# Patient Record
Sex: MALE | URBAN - METROPOLITAN AREA
[De-identification: names, ages, dates, MRNs, and addresses within clinical notes are randomized per-mention and may not be internally consistent; named-entity substitution may affect disease eponyms.]

---
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
## 2022-02-21 ENCOUNTER — PATIENT MESSAGE (OUTPATIENT)
Dept: RESEARCH | Facility: HOSPITAL | Age: 22
End: 2022-02-21
Payer: OTHER GOVERNMENT

## 2024-10-28 ENCOUNTER — OFFICE VISIT (OUTPATIENT)
Dept: URGENT CARE | Facility: CLINIC | Age: 24
End: 2024-10-28
Payer: COMMERCIAL

## 2024-10-28 VITALS
TEMPERATURE: 99 F | RESPIRATION RATE: 18 BRPM | WEIGHT: 184 LBS | SYSTOLIC BLOOD PRESSURE: 118 MMHG | DIASTOLIC BLOOD PRESSURE: 61 MMHG | OXYGEN SATURATION: 96 % | BODY MASS INDEX: 24.92 KG/M2 | HEART RATE: 75 BPM | HEIGHT: 72 IN

## 2024-10-28 DIAGNOSIS — J02.9 SORE THROAT: ICD-10-CM

## 2024-10-28 DIAGNOSIS — J02.0 STREP PHARYNGITIS: Primary | ICD-10-CM

## 2024-10-28 LAB
CTP QC/QA: YES
MOLECULAR STREP A: POSITIVE

## 2024-10-28 PROCEDURE — 99203 OFFICE O/P NEW LOW 30 MIN: CPT | Mod: S$GLB,,, | Performed by: PHYSICIAN ASSISTANT

## 2024-10-28 PROCEDURE — 87651 STREP A DNA AMP PROBE: CPT | Mod: QW,S$GLB,, | Performed by: PHYSICIAN ASSISTANT

## 2024-10-28 RX ORDER — LISDEXAMFETAMINE DIMESYLATE 40 MG/1
40 CAPSULE ORAL
COMMUNITY
Start: 2024-10-22

## 2024-10-28 RX ORDER — CEPHALEXIN 500 MG/1
500 CAPSULE ORAL EVERY 12 HOURS
Qty: 20 CAPSULE | Refills: 0 | Status: SHIPPED | OUTPATIENT
Start: 2024-10-28 | End: 2024-11-07

## 2024-11-07 ENCOUNTER — OFFICE VISIT (OUTPATIENT)
Dept: URGENT CARE | Facility: CLINIC | Age: 24
End: 2024-11-07
Payer: COMMERCIAL

## 2024-11-07 VITALS
HEIGHT: 72 IN | HEART RATE: 71 BPM | DIASTOLIC BLOOD PRESSURE: 62 MMHG | SYSTOLIC BLOOD PRESSURE: 128 MMHG | RESPIRATION RATE: 18 BRPM | TEMPERATURE: 97 F | WEIGHT: 183.88 LBS | OXYGEN SATURATION: 98 % | BODY MASS INDEX: 24.91 KG/M2

## 2024-11-07 DIAGNOSIS — J06.9 URI WITH COUGH AND CONGESTION: ICD-10-CM

## 2024-11-07 DIAGNOSIS — J02.9 VIRAL PHARYNGITIS: Primary | ICD-10-CM

## 2024-11-07 LAB
CTP QC/QA: YES
MOLECULAR STREP A: NEGATIVE

## 2024-11-07 PROCEDURE — 87651 STREP A DNA AMP PROBE: CPT | Mod: QW,S$GLB,, | Performed by: PHYSICIAN ASSISTANT

## 2024-11-07 PROCEDURE — 99214 OFFICE O/P EST MOD 30 MIN: CPT | Mod: S$GLB,,, | Performed by: PHYSICIAN ASSISTANT

## 2024-11-07 RX ORDER — PROMETHAZINE HYDROCHLORIDE AND DEXTROMETHORPHAN HYDROBROMIDE 6.25; 15 MG/5ML; MG/5ML
5 SYRUP ORAL NIGHTLY PRN
Qty: 140 ML | Refills: 0 | Status: SHIPPED | OUTPATIENT
Start: 2024-11-07 | End: 2024-11-14

## 2024-11-07 NOTE — PROGRESS NOTES
Subjective:      Patient ID: Juancarlos Quan is a 24 y.o. male.    Vitals:  height is 6' (1.829 m) and weight is 83.4 kg (183 lb 13.8 oz). His temperature is 96.8 °F (36 °C). His blood pressure is 128/62 and his pulse is 71. His respiration is 18 and oxygen saturation is 98%.     Chief Complaint: Sore Throat    Patient presents with sore throat. symptons started 1 week ago. Patient came in on 10/28/2024 and test positive for strep. Patient states medication that was given to him is not working throat still hurts.initially improving but 4 days ago started feeling worse. He was rx cephalexin and taking as prescribed. Almost completed. He is also coughing and congested. No fever      Sore Throat   This is a recurrent problem. The current episode started 1 to 4 weeks ago. The problem has been unchanged. There has been no fever. The pain is at a severity of 5/10. The pain is mild. Associated symptoms include congestion, coughing and headaches. Pertinent negatives include no abdominal pain, diarrhea, drooling, ear discharge, ear pain, hoarse voice, plugged ear sensation, neck pain, shortness of breath, stridor, swollen glands, trouble swallowing (painful swallowing) or vomiting. He has had no exposure to strep or mono. Treatments tried: keflex. The treatment provided no relief.       Constitution: Positive for chills and fatigue. Negative for sweating and fever.   HENT:  Positive for congestion and sore throat. Negative for ear pain, ear discharge, drooling and trouble swallowing (painful swallowing).    Neck: Negative for neck pain.   Eyes:  Negative for eye itching, eye pain and eye redness.   Respiratory:  Positive for cough and sputum production. Negative for shortness of breath and stridor.    Gastrointestinal:  Negative for abdominal pain, vomiting and diarrhea.   Musculoskeletal:  Positive for muscle ache.   Neurological:  Positive for headaches.      Objective:     Physical Exam   Constitutional: He is  oriented to person, place, and time. He appears well-developed. He is cooperative.  Non-toxic appearance. He does not appear ill. No distress.   HENT:   Head: Normocephalic and atraumatic.   Ears:   Right Ear: Hearing, tympanic membrane, external ear and ear canal normal.   Left Ear: Hearing, tympanic membrane, external ear and ear canal normal.   Nose: Congestion present. No mucosal edema, rhinorrhea or nasal deformity. No epistaxis. Right sinus exhibits no maxillary sinus tenderness and no frontal sinus tenderness. Left sinus exhibits no maxillary sinus tenderness and no frontal sinus tenderness.   Mouth/Throat: Uvula is midline and mucous membranes are normal. No trismus in the jaw. Normal dentition. No uvula swelling. Posterior oropharyngeal erythema and cobblestoning present. No oropharyngeal exudate or posterior oropharyngeal edema.   Eyes: Conjunctivae and lids are normal. No scleral icterus.   Neck: Trachea normal and phonation normal. Neck supple. No edema present. No erythema present. No neck rigidity present.   Cardiovascular: Normal rate, regular rhythm, normal heart sounds and normal pulses.   Pulmonary/Chest: Effort normal and breath sounds normal. No respiratory distress. He has no decreased breath sounds. He has no wheezes. He has no rhonchi.   Abdominal: Normal appearance.   Musculoskeletal: Normal range of motion.         General: No deformity. Normal range of motion.   Neurological: He is alert and oriented to person, place, and time. He exhibits normal muscle tone. Coordination normal.   Skin: Skin is warm, dry, intact, not diaphoretic and not pale.   Psychiatric: His speech is normal and behavior is normal. Judgment and thought content normal.   Nursing note and vitals reviewed.    Results for orders placed or performed in visit on 11/07/24   POCT Strep A, Molecular    Collection Time: 11/07/24 11:27 AM   Result Value Ref Range    Molecular Strep A, POC Negative Negative      Acceptable Yes        Assessment:     1. Viral pharyngitis    2. URI with cough and congestion        Plan:       Viral pharyngitis  -     POCT Strep A, Molecular    URI with cough and congestion  -     promethazine-dextromethorphan (PROMETHAZINE-DM) 6.25-15 mg/5 mL Syrp; Take 5 mLs by mouth nightly as needed (cough).  Dispense: 140 mL; Refill: 0    Caroline Fusilier PA-C [facility]       Patient Instructions   Complete your cephalexin. Strep is negative so you dont need additional antibiotics. Take mucinex DM for daytime use. The prescribed promethazine DM at night (may cause drowsiness). Alternatively, if your cough becomes more severe you may instead take promethazine DM cough syrup every 4-6 hours (but do not combine with over the counter cough syrup). Rest and drink plenty of fluids.Tylenol or motrin for fever, sore throat or body aches. Flonase 1 spray/nostril daily will help with nasal congestion. Salt water gargles, honey teas. Sudafed (pseudoephedrine) if severe congestion.     You need re-evaluated for any new persistent fever > 4 days, progressive chest pain or shortness of breath, or cough > 4 weeks.          Medical Decision Making:   Clinical Tests:   Lab Tests: Ordered and Reviewed       <> Summary of Lab: Neg strep  Urgent Care Management:  Neg strep today. S/S and HPI consistent with catching a viral illness while still recovering from strep. Does not need additional abx. Rx antitussives, rec salt water gargles

## 2024-11-07 NOTE — PATIENT INSTRUCTIONS
Complete your cephalexin. Strep is negative so you dont need additional antibiotics. Take mucinex DM for daytime use. The prescribed promethazine DM at night (may cause drowsiness). Alternatively, if your cough becomes more severe you may instead take promethazine DM cough syrup every 4-6 hours (but do not combine with over the counter cough syrup). Rest and drink plenty of fluids.Tylenol or motrin for fever, sore throat or body aches. Flonase 1 spray/nostril daily will help with nasal congestion. Salt water gargles, honey teas. Sudafed (pseudoephedrine) if severe congestion.     You need re-evaluated for any new persistent fever > 4 days, progressive chest pain or shortness of breath, or cough > 4 weeks.